# Patient Record
Sex: FEMALE | Race: BLACK OR AFRICAN AMERICAN | NOT HISPANIC OR LATINO | Employment: UNEMPLOYED | URBAN - METROPOLITAN AREA
[De-identification: names, ages, dates, MRNs, and addresses within clinical notes are randomized per-mention and may not be internally consistent; named-entity substitution may affect disease eponyms.]

---

## 2019-12-20 ENCOUNTER — ANCILLARY PROCEDURE (OUTPATIENT)
Dept: ULTRASOUND IMAGING | Facility: CLINIC | Age: 69
End: 2019-12-20
Attending: FAMILY MEDICINE
Payer: COMMERCIAL

## 2019-12-20 DIAGNOSIS — M79.89 LEFT LEG SWELLING: ICD-10-CM

## 2020-07-03 ENCOUNTER — HOSPITAL ENCOUNTER (EMERGENCY)
Facility: CLINIC | Age: 70
Discharge: HOME | End: 2020-07-03
Attending: FAMILY MEDICINE | Admitting: FAMILY MEDICINE
Payer: MEDICARE

## 2020-07-03 ENCOUNTER — APPOINTMENT (OUTPATIENT)
Dept: GENERAL RADIOLOGY | Facility: CLINIC | Age: 70
End: 2020-07-03
Attending: FAMILY MEDICINE
Payer: MEDICARE

## 2020-07-03 VITALS
TEMPERATURE: 97.2 F | OXYGEN SATURATION: 100 % | DIASTOLIC BLOOD PRESSURE: 62 MMHG | SYSTOLIC BLOOD PRESSURE: 144 MMHG | WEIGHT: 207 LBS | RESPIRATION RATE: 16 BRPM | BODY MASS INDEX: 28.87 KG/M2 | HEART RATE: 63 BPM

## 2020-07-03 DIAGNOSIS — S92.502D: ICD-10-CM

## 2020-07-03 DIAGNOSIS — S92.515A: ICD-10-CM

## 2020-07-03 PROCEDURE — 25000132 ZZH RX MED GY IP 250 OP 250 PS 637: Mod: GY | Performed by: FAMILY MEDICINE

## 2020-07-03 PROCEDURE — 73630 X-RAY EXAM OF FOOT: CPT | Mod: LT

## 2020-07-03 PROCEDURE — 99283 EMERGENCY DEPT VISIT LOW MDM: CPT | Mod: Z6 | Performed by: FAMILY MEDICINE

## 2020-07-03 PROCEDURE — 99283 EMERGENCY DEPT VISIT LOW MDM: CPT

## 2020-07-03 RX ORDER — ACETAMINOPHEN 500 MG
1000 TABLET ORAL ONCE
Status: COMPLETED | OUTPATIENT
Start: 2020-07-03 | End: 2020-07-03

## 2020-07-03 RX ORDER — LISINOPRIL 20 MG/1
20 TABLET ORAL
COMMUNITY
Start: 2019-12-09

## 2020-07-03 RX ADMIN — ACETAMINOPHEN 1000 MG: 500 TABLET ORAL at 12:44

## 2020-07-03 ASSESSMENT — ENCOUNTER SYMPTOMS
CONFUSION: 0
ACTIVITY CHANGE: 1
EYE REDNESS: 0
DECREASED CONCENTRATION: 0
WOUND: 0
COLOR CHANGE: 0
HEADACHES: 0
ABDOMINAL PAIN: 0
FEVER: 0
BRUISES/BLEEDS EASILY: 0
MYALGIAS: 0
DIFFICULTY URINATING: 0
DYSPHORIC MOOD: 0
SHORTNESS OF BREATH: 0
WEAKNESS: 0
JOINT SWELLING: 0
ARTHRALGIAS: 1
NECK STIFFNESS: 0

## 2020-07-03 NOTE — DISCHARGE INSTRUCTIONS
Home.  Xray shows healing fracture of the left 2nd toe.  Wear firm shoe given today for comfort.  Ice as needed and tylenol.  Follow up with MD in a week for recheck.    Results for orders placed or performed during the hospital encounter of 07/03/20   Foot XR, G/E 3 views, left     Status: None    Narrative    FOOT THREE VIEWS LEFT  7/3/2020 1:17 PM     HISTORY: left foot injury with pain in distal foot    COMPARISON: None.      Impression    IMPRESSION: Nondisplaced obliquely oriented fracture of the second  proximal phalangeal diaphysis. There is healing with callus formation.  No intra-articular extension. Normal joint alignment maintained. Mild  degenerative changes throughout the midfoot and forefoot.    THOMAS CHAU MD

## 2020-07-03 NOTE — ED NOTES
Pt given discharge paperwork and her son about setting up follow up appointment, ice/tylonel, when to seek medical treatment in the future, and follow up care. Vital signs are stable. Son and patient had no further questions or concerns. She left with ortho shoe on.

## 2020-07-03 NOTE — ED NOTES
Pt presents to the ED with complaints of left foot pain. Pt states she fell three weeks ago, and continuing to have pain. Pt has not been seen for this concern. Pt denies any symptoms of covid19. Pt states she takes two pills a day, one is for blood pressure, unsure of name of the medication and unsure of what the other medication is.

## 2020-07-03 NOTE — ED PROVIDER NOTES
[facility] EMERGENCY DEPARTMENT ([facility])    7/03/20        History     Chief Complaint   Patient presents with     Foot Pain     pt has been having left foot pain for three weeks.      JAYA Horan is a 70 year old female with a past medical history of chronic shoulder pain, HTN, and LLE DVT who presents to the Emergency Department for evaluation left leg pain.  Patient fell 3 weeks ago.  Patient notes tenderness in the distal forefoot the whole leg hurts somewhat but primarily the foot.  Has some pain with walking is not take anything for the pain now presents for evaluation.    I have reviewed the Medications, Allergies, Past Medical and Surgical History, and Social History in the [employer] system.  PAST MEDICAL HISTORY: History reviewed. No pertinent past medical history.    PAST SURGICAL HISTORY: History reviewed. No pertinent surgical history.    Past medical history, past surgical history, medications, and allergies were reviewed with the patient. Additional pertinent items: None    FAMILY HISTORY: History reviewed. No pertinent family history.    SOCIAL HISTORY:   Social History     Tobacco Use     Smoking status: Never Smoker     Smokeless tobacco: Never Used   Substance Use Topics     Alcohol use: Not Currently     Alcohol/week: 0.0 standard drinks     Social history was reviewed with the patient. Additional pertinent items: None            Discharge Medication List as of 7/3/2020  2:40 PM      CONTINUE these medications which have NOT CHANGED    Details   acetaminophen (TYLENOL) 650 MG suppository Place 650 mg rectally every 4 hours as needed for fever, Historical      ASPIRIN PO Take 81 mg by mouth, Historical      linaclotide (LINZESS) 145 MCG capsule Take 145 mcg by mouth, Historical      lisinopril (ZESTRIL) 20 MG tablet Take 20 mg by mouth, Historical      !! UNKNOWN TO PATIENT Historical      !! UNKNOWN TO PATIENT Historical       !! - Potential duplicate medications found. Please  discuss with provider.           No Known Allergies     Review of Systems   Constitutional: Positive for activity change. Negative for fever.   HENT: Negative for congestion.    Eyes: Negative for redness.   Respiratory: Negative for shortness of breath.    Cardiovascular: Negative for chest pain.   Gastrointestinal: Negative for abdominal pain.   Genitourinary: Negative for difficulty urinating.   Musculoskeletal: Positive for arthralgias and gait problem. Negative for joint swelling, myalgias and neck stiffness.        Left foot pain   Skin: Negative for color change, rash and wound.   Allergic/Immunologic: Negative for immunocompromised state.   Neurological: Negative for weakness and headaches.   Hematological: Does not bruise/bleed easily.   Psychiatric/Behavioral: Negative for confusion, decreased concentration and dysphoric mood.   All other systems reviewed and are negative.    Physical Exam   BP: 136/57  Pulse: 87  Temp: 97.5  F (36.4  C)  Resp: 16  Weight: 93.9 kg (207 lb)  SpO2: 100 %      Physical Exam  Vitals signs and nursing note reviewed.   Constitutional:       General: She is in acute distress.      Appearance: She is not ill-appearing or diaphoretic.      Comments: Mild discomfort  Son here interprets   HENT:      Head: Atraumatic.      Mouth/Throat:      Pharynx: No oropharyngeal exudate.   Eyes:      General: No scleral icterus.     Pupils: Pupils are equal, round, and reactive to light.   Neck:      Musculoskeletal: Normal range of motion.   Cardiovascular:      Rate and Rhythm: Normal rate.      Heart sounds: Normal heart sounds.   Pulmonary:      Effort: No respiratory distress.      Breath sounds: Normal breath sounds.   Abdominal:      General: Bowel sounds are normal.      Palpations: Abdomen is soft.      Tenderness: There is no abdominal tenderness.   Musculoskeletal:         General: Tenderness (left foot) present. No swelling.        Feet:    Skin:     General: Skin is warm.       Capillary Refill: Capillary refill takes less than 2 seconds.      Findings: No rash.   Neurological:      General: No focal deficit present.      Mental Status: Mental status is at baseline.   Psychiatric:         Mood and Affect: Mood normal.         Behavior: Behavior normal.         ED Course        Procedures         xrays ordered of foot.  X-rays reveal a healing fracture of the second toe the proximal phalanx without displacement.  No other injuries noted.  This reviewed by radiology also discussed with patient family  Patient placed in a postop shoe feels better would like to go home.  Conservative management follow-up with MD                Results for orders placed or performed during the hospital encounter of 07/03/20 (from the past 24 hour(s))   Foot XR, G/E 3 views, left    Narrative    FOOT THREE VIEWS LEFT  7/3/2020 1:17 PM     HISTORY: left foot injury with pain in distal foot    COMPARISON: None.      Impression    IMPRESSION: Nondisplaced obliquely oriented fracture of the second  proximal phalangeal diaphysis. There is healing with callus formation.  No intra-articular extension. Normal joint alignment maintained. Mild  degenerative changes throughout the midfoot and forefoot.    THOMAS CHAU MD     Medications   acetaminophen (TYLENOL) tablet 1,000 mg (1,000 mg Oral Given 7/3/20 1244)            Results for orders placed or performed during the hospital encounter of 07/03/20   Foot XR, G/E 3 views, left     Status: None    Narrative    FOOT THREE VIEWS LEFT  7/3/2020 1:17 PM     HISTORY: left foot injury with pain in distal foot    COMPARISON: None.      Impression    IMPRESSION: Nondisplaced obliquely oriented fracture of the second  proximal phalangeal diaphysis. There is healing with callus formation.  No intra-articular extension. Normal joint alignment maintained. Mild  degenerative changes throughout the midfoot and forefoot.    THOMAS CHAU MD         Assessments & Plan (with  Medical Decision Making)  70-year-old female who had a fall 3 weeks ago complains with left foot pain.  X-rays done revealing nondisplaced healing fracture of the second toe proximal phalanx no other injury seen.  Patient given a postop shoe feeling better will be discharged follow-up in a week discharge with family agree with plan.         I have reviewed the nursing notes.    I have reviewed the findings, diagnosis, plan and need for follow up with the patient.    Discharge Medication List as of 7/3/2020  2:40 PM          Final diagnoses:   Closed fracture of phalanx of left second toe with routine healing       7/3/2020   [facility], EMERGENCY DEPARTMENT      This note was created at least in part by the use of dragon voice dictation system. Inadvertent typographical errors may still exist.  Jean Carlos Euceda MD.    Patient evaluated in the emergency department during the COVID-19 pandemic period. Careful attention to patients safety was addressed throughout the evaluation. Evaluation and treatment management was initiated with disposition made efficiently and appropriate as possible to minimize any risk of potential exposure to patient during this evaluation.       Jean Carlos Euceda MD  07/03/20 0473

## 2020-07-03 NOTE — ED AVS SNAPSHOT
[facility], [place], Emergency Department  [address]  [facility] [address]  Phone:  [phone]  Fax:  [phone]                                    Estela Horan   MRN: [id number]    Department:  [facility], Emergency Department   Date of Visit:  7/3/2020           After Visit Summary Signature Page    I have received my discharge instructions, and my questions have been answered. I have discussed any challenges I see with this plan with the nurse or doctor.    ..........................................................................................................................................  Patient/Patient Representative Signature      ..........................................................................................................................................  Patient Representative Print Name and Relationship to Patient    ..................................................               ................................................  Date                                   Time    ..........................................................................................................................................  Reviewed by Signature/Title    ...................................................              ..............................................  Date                                               Time          22EPIC Rev 08/18

## 2020-12-28 ENCOUNTER — HOSPITAL ENCOUNTER (OUTPATIENT)
Dept: GENERAL RADIOLOGY | Facility: CLINIC | Age: 70
End: 2020-12-28
Attending: FAMILY MEDICINE
Payer: COMMERCIAL

## 2020-12-28 DIAGNOSIS — G89.29 CHRONIC BILATERAL LOW BACK PAIN WITH BILATERAL SCIATICA: ICD-10-CM

## 2020-12-28 DIAGNOSIS — M54.42 CHRONIC BILATERAL LOW BACK PAIN WITH BILATERAL SCIATICA: ICD-10-CM

## 2020-12-28 DIAGNOSIS — M25.561 BILATERAL CHRONIC KNEE PAIN: ICD-10-CM

## 2020-12-28 DIAGNOSIS — M54.41 CHRONIC BILATERAL LOW BACK PAIN WITH BILATERAL SCIATICA: ICD-10-CM

## 2020-12-28 DIAGNOSIS — M25.562 BILATERAL CHRONIC KNEE PAIN: ICD-10-CM

## 2020-12-28 DIAGNOSIS — G89.29 BILATERAL CHRONIC KNEE PAIN: ICD-10-CM

## 2020-12-28 PROCEDURE — 72100 X-RAY EXAM L-S SPINE 2/3 VWS: CPT

## 2020-12-28 PROCEDURE — 73560 X-RAY EXAM OF KNEE 1 OR 2: CPT | Mod: 26 | Performed by: RADIOLOGY

## 2020-12-28 PROCEDURE — 72100 X-RAY EXAM L-S SPINE 2/3 VWS: CPT | Mod: 26 | Performed by: RADIOLOGY

## 2020-12-28 PROCEDURE — 73560 X-RAY EXAM OF KNEE 1 OR 2: CPT | Mod: 50

## 2022-06-07 ENCOUNTER — TRANSCRIBE ORDERS (OUTPATIENT)
Dept: OTHER | Age: 72
End: 2022-06-07

## 2022-06-07 DIAGNOSIS — Z86.718 PERSONAL HISTORY OF DVT (DEEP VEIN THROMBOSIS): Primary | ICD-10-CM

## 2022-07-15 ENCOUNTER — HOSPITAL ENCOUNTER (EMERGENCY)
Facility: CLINIC | Age: 72
Discharge: HOME | End: 2022-07-15
Attending: INTERNAL MEDICINE | Admitting: INTERNAL MEDICINE
Payer: COMMERCIAL

## 2022-07-15 ENCOUNTER — HOSPITAL ENCOUNTER (OUTPATIENT)
Dept: ULTRASOUND IMAGING | Facility: CLINIC | Age: 72
Discharge: HOME | End: 2022-07-15
Attending: PHYSICIAN ASSISTANT | Admitting: PHYSICIAN ASSISTANT
Payer: COMMERCIAL

## 2022-07-15 VITALS
TEMPERATURE: 98.3 F | DIASTOLIC BLOOD PRESSURE: 66 MMHG | OXYGEN SATURATION: 98 % | HEART RATE: 62 BPM | RESPIRATION RATE: 16 BRPM | SYSTOLIC BLOOD PRESSURE: 144 MMHG

## 2022-07-15 DIAGNOSIS — Z86.718 HISTORY OF DVT (DEEP VEIN THROMBOSIS): ICD-10-CM

## 2022-07-15 DIAGNOSIS — M79.89 LEG SWELLING: ICD-10-CM

## 2022-07-15 DIAGNOSIS — I82.412 ACUTE DEEP VEIN THROMBOSIS (DVT) OF FEMORAL VEIN OF LEFT LOWER EXTREMITY (H): ICD-10-CM

## 2022-07-15 LAB — RADIOLOGIST FLAGS: ABNORMAL

## 2022-07-15 PROCEDURE — 93970 EXTREMITY STUDY: CPT

## 2022-07-15 PROCEDURE — 93970 EXTREMITY STUDY: CPT | Mod: 26 | Performed by: RADIOLOGY

## 2022-07-15 PROCEDURE — 99283 EMERGENCY DEPT VISIT LOW MDM: CPT | Performed by: INTERNAL MEDICINE

## 2022-07-15 PROCEDURE — 99283 EMERGENCY DEPT VISIT LOW MDM: CPT

## 2022-07-15 ASSESSMENT — ENCOUNTER SYMPTOMS: MYALGIAS: 0

## 2022-07-15 NOTE — ED PROVIDER NOTES
[facility] EMERGENCY DEPARTMENT ([facility])     July 15, 2022     History     Chief Complaint   Patient presents with     Leg Pain     Left leg pain; confirmed clot from ultrasound; left leg     HPI  Estela Horan is a 72 year old [place] female on aspirin with a past medical history including DVT, benign essential HTN, right hemopneumothorax, hypokalemia who presents to the Emergency Department for evaluation of left leg pain. Patient reports through [place] . She had an US performed today which showed a blood clot in her left leg, included below.  Endorses left leg swelling.  She denies chest pain or any other pain, and states she took an aspirin earlier. Denies shortness of breath. She endorses a history of blood clots, but said she last had them a long time ago and that she was told there are some left in her veins.  She does not remember the name of what her medication was when she originally had DVTs.  The patient requests that her medications be sent to [facility] pharmacy.    DOPPLER VENOUS ULTRASOUND OF BILATERAL LOWER EXTREMITIES,  7/15/2022 3:37 PM  IMPRESSION:  1. Nonocclusive thrombus within the distal left femoral vein.  2. There is partially occlusive thrombus in one of the paired left  posterior tibial veins. There is fully occlusive thrombus in the other  left posterior tibial vein.      Past Medical History  History reviewed. No pertinent past medical history.  No past surgical history on file.  acetaminophen (TYLENOL) 650 MG suppository  rivaroxaban ANTICOAGULANT (XARELTO) 15 MG TABS tablet  linaclotide (LINZESS) 145 MCG capsule  lisinopril (ZESTRIL) 20 MG tablet      No Known Allergies  Past medical history, past surgical history, medications, and allergies were reviewed with the patient. Additional pertinent items: Retrieved from Care Everywhere.    Family History  No family history on file.  Family history was reviewed with the patient. Additional pertinent items: None    Social  History  Social History     Tobacco Use     Smoking status: Never Smoker     Smokeless tobacco: Never Used   Substance Use Topics     Alcohol use: Not Currently     Alcohol/week: 0.0 standard drinks     Drug use: Not Currently      Social history was reviewed with the patient. Additional pertinent items: None      Review of Systems   Constitutional: Negative for fever.   HENT: Negative for congestion.    Eyes: Negative for redness.   Respiratory: Negative for shortness of breath.    Cardiovascular: Positive for leg swelling (left). Negative for chest pain.   Gastrointestinal: Negative for abdominal pain.   Genitourinary: Negative for difficulty urinating.   Musculoskeletal: Negative for arthralgias, myalgias and neck stiffness.   Skin: Negative for color change.   Neurological: Negative for headaches.   Psychiatric/Behavioral: Negative for confusion.   All other systems reviewed and are negative.    A complete review of systems was performed with pertinent positives and negatives noted in the HPI, and all other systems negative.    Physical Exam   BP: (!) 144/66  Pulse: 62  Temp: 98.3  F (36.8  C)  Resp: 16  SpO2: 98 %  Physical Exam  Constitutional:       General: She is not in acute distress.     Appearance: She is not diaphoretic.   HENT:      Head: Atraumatic.      Mouth/Throat:      Pharynx: No oropharyngeal exudate.   Eyes:      General: No scleral icterus.     Pupils: Pupils are equal, round, and reactive to light.   Cardiovascular:      Rate and Rhythm: Normal rate and regular rhythm.      Heart sounds: Normal heart sounds. No murmur heard.    No friction rub. No gallop.   Pulmonary:      Effort: Pulmonary effort is normal. No respiratory distress.      Breath sounds: Normal breath sounds. No stridor. No wheezing, rhonchi or rales.   Chest:      Chest wall: No tenderness.   Abdominal:      General: Abdomen is flat. Bowel sounds are normal. There is no distension.      Palpations: Abdomen is soft. There is no  mass.      Tenderness: There is no abdominal tenderness. There is no right CVA tenderness, left CVA tenderness, guarding or rebound.      Hernia: No hernia is present.   Musculoskeletal:         General: Swelling and tenderness present. No deformity or signs of injury.      Cervical back: Neck supple.      Right lower leg: No edema.      Left lower leg: Edema present.   Skin:     General: Skin is warm.      Findings: No rash.   Neurological:      General: No focal deficit present.      Cranial Nerves: No cranial nerve deficit.      Motor: No weakness.         ED Course     5:14 PM  The patient was seen and examined by Dario Lopez MD in Room ED20.    Procedures                     Results for orders placed or performed during the hospital encounter of 07/15/22   US Lower Extremity Venous Duplex Bilateral     Status: Abnormal   Result Value Ref Range    Radiologist flags Left lower extremity deep venous thrombosis (Urgent)     Narrative    EXAMINATION: DOPPLER VENOUS ULTRASOUND OF BILATERAL LOWER EXTREMITIES,  7/15/2022 3:37 PM     COMPARISON: Ultrasound lower extremity 12/20/2019 and 6/12/2015    HISTORY: Edema    TECHNIQUE:  Gray-scale evaluation with compression, spectral flow and  color Doppler assessment of the deep venous system of both legs from  groin to knee, and then at the ankles.    FINDINGS:  Right: the common femoral, femoral, popliteal and posterior tibial  veins demonstrate normal compressibility and blood flow. Great  saphenous vein demonstrates normal compressibility and blood flow.    Left: the common femoral and popliteal veins demonstrate normal  compressibility and blood flow. The proximal and mid femoral vein are  fully compressible with normal flow and waveforms. The distal femoral  vein demonstrates echogenic thrombus within the lumen, and is  partially compressible with diminished flow and waveforms. The  posterior tibial vein demonstrates echogenic thrombus within the lumen  proximally,  is partially compressible, and shows limited color Doppler  flow. The distal posterior tibial vein is fully compressible . The  second posterior tibial vein proximally demonstrates echogenic clot  within the lumen, is noncompressible and has absent flow on color  Doppler. The distal left posterior tibial vein  is partially  compressible. The peroneal vein is not visualized.      Impression    IMPRESSION:  1. Nonocclusive thrombus within the distal left femoral vein.  2. There is partially occlusive thrombus in one of the paired left  posterior tibial veins. There is fully occlusive thrombus in the other  left posterior tibial vein.    [Urgent Result: Left lower extremity deep venous thrombosis    Finding was identified on 7/15/2022 3:38 PM.     Dr. Garcia attempted to contact ordering provider Polina Mcdaniels at  7/15/2022 3:57 PM  with the provided number. Provider was not able to  be contacted with the urgent result, so the patient was sent to the  emergency Department.    I have personally reviewed the examination and initial interpretation  and I agree with the findings.    KENNEDY ESCALONA MD         SYSTEM ID:  [id number]     Medications - No data to display     Assessments & Plan (with Medical Decision Making)  Acute recurrent left leg DVT, labs done at the [facility]-calculated GFR 52, will start xarelto 15 bid for 21 days, maintenance part per her PMD, should follow up in 1-2 weeks.       I have reviewed the nursing notes. I have reviewed the findings, diagnosis, plan and need for follow up with the patient.    Discharge Medication List as of 7/15/2022  6:04 PM      START taking these medications    Details   rivaroxaban ANTICOAGULANT (XARELTO) 15 MG TABS tablet Take 1 tablet (15 mg) by mouth 2 times daily (with meals) for 21 days, Disp-42 tablet, R-0, E-Prescribe             Final diagnoses:   Acute deep vein thrombosis (DVT) of femoral vein of left lower extremity (H)     ITara, am serving as a  trained medical scribe to document services personally performed by Dario Lopez MD, based on the provider's statements to me.   I, Dario Lopez MD, was physically present and have reviewed and verified the accuracy of this note documented by Tara Shahid.    --  Dario Lopez Md  [facility] EMERGENCY DEPARTMENT  7/15/2022     Dario Lopez MD  07/16/22 0101

## 2022-07-15 NOTE — DISCHARGE INSTRUCTIONS
Please start taking blood thinner that has been prescribed and make an appointment to follow up with Your Primary Care Provider in 5-10 days even if entirely better.

## 2022-07-16 ASSESSMENT — ENCOUNTER SYMPTOMS
COLOR CHANGE: 0
ARTHRALGIAS: 0
CONFUSION: 0
HEADACHES: 0
NECK STIFFNESS: 0
ABDOMINAL PAIN: 0
FEVER: 0
DIFFICULTY URINATING: 0
SHORTNESS OF BREATH: 0
EYE REDNESS: 0

## 2022-10-04 ENCOUNTER — TRANSCRIBE ORDERS (OUTPATIENT)
Dept: OTHER | Age: 72
End: 2022-10-04

## 2022-10-04 DIAGNOSIS — D50.9 ANEMIA, IRON DEFICIENCY: ICD-10-CM

## 2022-10-04 DIAGNOSIS — Z86.718 PERSONAL HISTORY OF DVT (DEEP VEIN THROMBOSIS): Primary | ICD-10-CM

## 2022-10-18 NOTE — TELEPHONE ENCOUNTER
RECORDS STATUS - ALL OTHER DIAGNOSIS      RECORDS RECEIVED FROM: [facility]   DATE RECEIVED: 10/18/22   NOTES STATUS DETAILS   OFFICE NOTE from referring provider MARLENI 09/29/22:Dr. Lucretia Snow   DISCHARGE REPORT from the ER Epic, [facility] 07/15/22: [facility] ED  10/21/10: [facility]   MEDICATION LIST EPIC    LABS     ANYTHING RELATED TO DIAGNOSIS MARLENI Most recent 08/12/22   IMAGING (NEED IMAGES & REPORT)     ULTRASOUND PACS 07/15/22, 12/20/19, 06/12/15: US Lower Extremity

## 2022-10-19 DIAGNOSIS — D50.0 IRON DEFICIENCY ANEMIA DUE TO CHRONIC BLOOD LOSS: Primary | ICD-10-CM

## 2022-10-20 ENCOUNTER — PRE VISIT (OUTPATIENT)
Dept: TRANSPLANT | Facility: CLINIC | Age: 72
End: 2022-10-20

## 2023-10-20 ENCOUNTER — MEDICAL CORRESPONDENCE (OUTPATIENT)
Dept: HEALTH INFORMATION MANAGEMENT | Facility: CLINIC | Age: 73
End: 2023-10-20
Payer: COMMERCIAL

## 2023-10-23 ENCOUNTER — TRANSCRIBE ORDERS (OUTPATIENT)
Dept: OTHER | Age: 73
End: 2023-10-23

## 2023-10-23 DIAGNOSIS — Z86.718 HISTORY OF DVT (DEEP VEIN THROMBOSIS): Primary | ICD-10-CM

## 2023-10-23 DIAGNOSIS — R60.0 EDEMA OF BOTH LEGS: ICD-10-CM

## 2023-10-24 ENCOUNTER — TRANSCRIBE ORDERS (OUTPATIENT)
Dept: OTHER | Age: 73
End: 2023-10-24

## 2023-10-24 DIAGNOSIS — G89.29 CHRONIC LOW BACK PAIN, UNSPECIFIED BACK PAIN LATERALITY, UNSPECIFIED WHETHER SCIATICA PRESENT: Primary | ICD-10-CM

## 2023-10-24 DIAGNOSIS — M79.89 LEG SWELLING: Primary | ICD-10-CM

## 2023-10-24 DIAGNOSIS — M54.50 CHRONIC LOW BACK PAIN, UNSPECIFIED BACK PAIN LATERALITY, UNSPECIFIED WHETHER SCIATICA PRESENT: Primary | ICD-10-CM

## 2023-12-06 ENCOUNTER — TELEPHONE (OUTPATIENT)
Dept: VASCULAR SURGERY | Facility: CLINIC | Age: 73
End: 2023-12-06
Payer: COMMERCIAL

## 2023-12-07 ENCOUNTER — TELEPHONE (OUTPATIENT)
Dept: VASCULAR SURGERY | Facility: CLINIC | Age: 73
End: 2023-12-07
Payer: COMMERCIAL

## 2023-12-12 NOTE — PROGRESS NOTES
INTERVENTIONAL RADIOLOGY CLINIC NOTE    Estela Horan  [id number]  12/13/23    CC:  History of DVT (deep vein thrombosis) and Edema of both legs     HPI:  Estela High a 73 year old female with a PMH of chronic lower back pain, hx of DVT, edema, CKD, iron deficiency, HTN, pre-diabetes who presents for evaluation.     Jimmy yooneint had a US LE extremity on 7/15/22 and was found to have a non-occlusive thrombus in distal left femoral vein, there is a partially occlusive thrombus in one of the pair left posterial tibial veins, fully occlusive thrombus in other left posterial tibial vein.     Patient reports that she's been having pain and discomfort in her legs for 17 years. She had a blood clot in left side, and since that time she's had more swelling in the left leg. She notes pain throughout both of her legs, movement tends to make it worse. She reports leg heaviness, swelling, aching, and muscle cramps. Denies use of compression stockings. Denies skin dryness, tightness, bleeding, itching, skin irritation, and muscle cramps. Denies having varicosities. Denies family history. Denies trauma to the extremity. She had 17 pregnancies, 5 living children.     PAST MEDICAL HISTORY:  chronic lower back pain, hx of DVT, edema, CKD, iron deficiency, HTN, pre-diabetes    PAST SURGICAL HISTORY:  No past surgical history on file.    MEDICATIONS:  Current Outpatient Medications   Medication    acetaminophen (TYLENOL) 650 MG suppository    linaclotide (LINZESS) 145 MCG capsule    lisinopril (ZESTRIL) 20 MG tablet    rivaroxaban ANTICOAGULANT (XARELTO) 15 MG TABS tablet     No current facility-administered medications for this visit.     ALLERGIES:   No Known Allergies    PHYSICAL EXAMINATION:  /61 (BP Location: Left arm, Patient Position: Sitting)   Pulse 74   SpO2 100%   General: AAOx3. Pleasant in NAD.   HEENT: NC/AT  LUNGS: Normal respirations.  LEGS: Pulses +1 bilateral. +1 pitting edema, no telangiectasias or  varicosities.     LABS/IMAGING:  No recent lab results.                  Imaging Reviewed:  US lower extremity venous duplex bilateral 7/15/22:  IMPRESSION:  1. Nonocclusive thrombus within the distal left femoral vein.  2. There is partially occlusive thrombus in one of the paired left  posterior tibial veins. There is fully occlusive thrombus in the other  left posterior tibial vein.    US venous competency bilateral 23:  Impression:  1. Right lea. No deep or superficial venous thrombosis.  1b. Incompetent CFV above the SFJ with Valsalva alone.  1c. Incompetent proximal calf GSV, and SSV at the popliteal fossa and  proximal calf.  1d. No incompetent varicose or  veins.  2. Left leg  2a. No deep or superficial venous thrombosis.  2b. Incompetent distal femoral vein and popliteal vein.  2c. Incompetent mid calf SSV.  2d. No encompassing varicose or  veins.    US RYAN doppler 23:  IMPRESSION:  1. RIGHT:       A. Resting RYAN is 1.04, normal. Normal PVRs.     2. LEFT:       A. Resting RYAN is 1.21, normal. Normal PVRs.    IMPRESSION/REPORT/PLAN:  Summary: Estela High a 73 year old female with a PMH of chronic lower back pain, hx of DVT, edema, CKD, iron deficiency, HTN, pre-diabetes who presents for evaluation.     #Venous incompetence: ABIs normal. Right incompetent CFV, SFJ with valsalva, incompetent proximal calf GSV, and SSV at the popliteal fossa and prox calf. Left incompetent distal femoral vein and popliteal vein, incompetent mid calf SSV. Patient's BMI is elevated, she has also had 17 pregnancies (several miscarriages) with 5 living children. Pain in bilateral lower extremities is constant. Worsens with exertion. She has not been consistent with compression stockings. Informed patient that we do no provide pain medications for her leg discomfort.   #Hx of DVT on L: US LE extremity on 7/15/22 and was found to have a non-occlusive thrombus in distal left femoral vein, there  is a partially occlusive thrombus in one of the pair left posterial tibial veins, fully occlusive thrombus in other left posterial tibial vein.     PLAN:  -The patient's medical data, including pertinent imaging, was reviewed.    -Discussed need for weight loss, I suspect she would have improvement of her symptoms with this.    -Plan for compression stockings x3 months, thigh high 20-30 mmHg  -Given the nature of patient's pain throughout her legs, I informed her that it is not clear that her pain is totally related to her venous incompetence. I suspect given her discomfort, she may have other factors contributing.   -Pt to elevate her legs x1 hour per day  -Encouraged patient to walk 20 minutes per day  -Vascular follow up in 3 months, patient would like to speak with one of our IR physicians for further information in 3 months.  -All of the patient's questions were answered to their satisfaction.    Patient was communicated with via [employer] .     Kezia Chiu PA-C   Physician Assistant - Certified  Interventional Radiology  [phone] (IR control desk)      =====    A total of 35 minutes was spent with the patient.  Greater than 50% of the time was spent in counseling, education, and coordination of care.

## 2023-12-13 ENCOUNTER — OFFICE VISIT (OUTPATIENT)
Dept: VASCULAR SURGERY | Facility: CLINIC | Age: 73
End: 2023-12-13
Payer: COMMERCIAL

## 2023-12-13 ENCOUNTER — ANCILLARY PROCEDURE (OUTPATIENT)
Dept: ULTRASOUND IMAGING | Facility: CLINIC | Age: 73
End: 2023-12-13
Attending: RADIOLOGY
Payer: COMMERCIAL

## 2023-12-13 VITALS — HEART RATE: 74 BPM | SYSTOLIC BLOOD PRESSURE: 123 MMHG | DIASTOLIC BLOOD PRESSURE: 61 MMHG | OXYGEN SATURATION: 100 %

## 2023-12-13 DIAGNOSIS — M79.89 LEG SWELLING: ICD-10-CM

## 2023-12-13 DIAGNOSIS — I87.2 VENOUS INCOMPETENCE: Primary | ICD-10-CM

## 2023-12-13 PROCEDURE — 93922 UPR/L XTREMITY ART 2 LEVELS: CPT | Mod: GC | Performed by: RADIOLOGY

## 2023-12-13 PROCEDURE — 93970 EXTREMITY STUDY: CPT | Mod: GC | Performed by: RADIOLOGY

## 2023-12-13 PROCEDURE — 99203 OFFICE O/P NEW LOW 30 MIN: CPT | Performed by: PHYSICIAN ASSISTANT

## 2023-12-13 RX ORDER — ASPIRIN 81 MG/1
81 TABLET, COATED ORAL DAILY
COMMUNITY

## 2023-12-13 RX ORDER — AMLODIPINE BESYLATE 10 MG/1
1 TABLET ORAL DAILY
COMMUNITY
Start: 2023-11-28

## 2023-12-13 RX ORDER — IBUPROFEN 200 MG
1250 CAPSULE ORAL DAILY
COMMUNITY

## 2023-12-13 NOTE — PATIENT INSTRUCTIONS
INTERVENTIONAL RADIOLOGY CLINIC AFTER VISIT SUMMARY:  Dear Estela Horan,    Thank you for choosing the [facility]. I would like to highlight some of the important information from our visit.     [facility]: [address]  You have been given medical grade compression stockings, 20-30 mmHg thigh high  Please ensure proper sizing and fit  Wear during the day to help with symptoms, do not need to wear compression during the night  Encourage walking, a 20-minute walk/day  Elevate legs for an hour each day  Return to vascular clinic in 3 months for follow-up to discuss treatment options  If questions or concerns please call [phone]    Please do not hesitate to contact our department if you have any further questions or concerns.     Sincerely,    Kezia Chiu PA-C    [facility]  [address] [place] [address], [id number] [phone]

## 2023-12-21 ENCOUNTER — OFFICE VISIT (OUTPATIENT)
Dept: VASCULAR SURGERY | Facility: CLINIC | Age: 73
End: 2023-12-21
Attending: PHYSICIAN ASSISTANT
Payer: COMMERCIAL

## 2023-12-21 DIAGNOSIS — M79.89 LEG SWELLING: ICD-10-CM

## 2023-12-21 DIAGNOSIS — I87.2 VENOUS INCOMPETENCE: Primary | ICD-10-CM

## 2023-12-21 PROCEDURE — 99203 OFFICE O/P NEW LOW 30 MIN: CPT | Performed by: SURGERY

## 2023-12-21 NOTE — PROGRESS NOTES
I had the pleasure of seeing Mrs. Estela Horan in the vein clinic today.  This is a kind referral from Kezia Chiu PA-C.    Patient reports bilateral, left worse than right, swelling, heaviness, tightness and pain.    This has been going on for quite a number of years.    She has a remote history of deep venous thrombosis in the left lower extremity for which she was on anticoagulation for 6 months.    She also has back pain and bilateral knee pain.    On examination she has significant edema of both lower extremities, left worse than right.    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  There is incompetence of the left distal femoral and popliteal vein.  Superficial veins are competent.    Diagnosis: Bilateral lower extremity, left worse than right phlebolymphedema due to chronic ambulatory venous hypertension.    Plan: No role for surgical intervention or ablation.  I explained to the patient and to her family member her vein incompetence is very segmental and mild and surgical intervention with ablation will certainly not improve her symptoms.    Advise to referring provider, Kezia Chiu:  1.  Refer the patient for lymphedema therapy.  Lymphedema therapy referral needs to be placed by the referring provider.  Patient herself does not speak English.  Go to the patient's granddaughter Jazmine Damon, phone number [phone] to coordinate her lymphedema therapy referral.  2.  She is also complaining of significant knee joint pain.  Refer the patient for orthopedic evaluation.    She can follow-up with vein clinic on a as needed basis.

## 2023-12-21 NOTE — NURSING NOTE
Patient Reported symptoms:    Bilateral leg   Heaviness Most of the time   Achiness Most of the time   Swelling Most of the time   Throbbing A good bit of the time   Itching None of the time   Appearance Moderately noticeable   Impact on work/activities Symptoms but full able to participate

## 2023-12-21 NOTE — LETTER
12/21/2023         RE: Estela Horan  [address]  [facility] [address]        Dear Colleague,    Thank you for referring your patient, Estela Horan, to the [facility]. Please see a copy of my visit note below.    I had the pleasure of seeing Mrs. Estela Horan in the vein clinic today.  This is a kind referral from Kezia Chiu PA-C.    Patient reports bilateral, left worse than right, swelling, heaviness, tightness and pain.    This has been going on for quite a number of years.    She has a remote history of deep venous thrombosis in the left lower extremity for which she was on anticoagulation for 6 months.    She also has back pain and bilateral knee pain.    On examination she has significant edema of both lower extremities, left worse than right.    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  There is incompetence of the left distal femoral and popliteal vein.  Superficial veins are competent.    Diagnosis: Bilateral lower extremity, left worse than right phlebolymphedema due to chronic ambulatory venous hypertension.    Plan: No role for surgical intervention or ablation.  I explained to the patient and to her family member her vein incompetence is very segmental and mild and surgical intervention with ablation will certainly not improve her symptoms.    Advise to referring provider, Kezia Chiu:  1.  Refer the patient for lymphedema therapy.  2.  She is also complaining of significant knee joint pain.  Refer the patient for orthopedic evaluation.    She can follow-up with vein clinic on a as needed basis.      Again, thank you for allowing me to participate in the care of your patient.        Sincerely,        Kirill Jones MD

## 2023-12-27 ENCOUNTER — THERAPY VISIT (OUTPATIENT)
Dept: PHYSICAL THERAPY | Facility: CLINIC | Age: 73
End: 2023-12-27
Attending: SURGERY
Payer: COMMERCIAL

## 2023-12-27 DIAGNOSIS — M79.89 LEG SWELLING: ICD-10-CM

## 2023-12-27 PROCEDURE — 97535 SELF CARE MNGMENT TRAINING: CPT | Mod: GP | Performed by: PHYSICAL THERAPIST

## 2023-12-27 PROCEDURE — 97161 PT EVAL LOW COMPLEX 20 MIN: CPT | Mod: GP | Performed by: PHYSICAL THERAPIST

## 2023-12-27 NOTE — PROGRESS NOTES
PHYSICAL THERAPY EVALUATION  Type of Visit: Evaluation    See electronic medical record for Abuse and Falls Screening details.    Subjective   Is having pain in her legs, low back and bottom of the foot. She has had previous DVT, recent testing shows no current issues. Pt reports swelling for a few years, but it accelerated and more in left lower leg since DVT.      Presenting condition or subjective complaint: Leg swelling  Date of onset: 12/21/23   Relevant medical history:   Hx of DVT, CKD, iron deficiency, HTN, chronic lower back pain, pre-diabetes  Dates & types of surgery:      Prior diagnostic imaging/testing results: Other Ultrasound, DVT Rule out and venous insufficiency studies   Prior therapy history for the same diagnosis, illness or injury: No      Prior Level of Function  Transfers: Independent  Ambulation: Independent  ADL: Independent  IADL: Laundry, Meal preparation, Medication management    Living Environment  Social support:   Family assist  Type of home:     Stairs to enter the home:         Ramp:     Stairs inside the home:         Help at home:    Equipment owned:       Employment:      Hobbies/Interests:      Patient goals for therapy: walk, reduce pain    Pain assessment: Pain present  Pain primarily in feet, increasing over the last few months, rated 8/10.  Sharp pain to the feet, dull pain to legs     Objective       EDEMA EVALUATION  Additional history:  Body part affected by edema: BLE Left worse than R  If cancer related, treatment:    If not cancer related, problems with veins or cause of swelling: Yes, Venous insufficiency  Distance able to walk: limiting, no longer than 5 minutes  Time able to stand: 5-10 minutes  Sensation problems in hands/feet: Yes    Edema etiology: Chronic Venous Insufficiency, DVT    FUNCTIONAL SCALES  LLIS 46    Cognitive Status Examination  Orientation: Oriented to person, place and time   Level of Consciousness: Alert  Follows Commands and Answers Questions:  100% of the time  Personal Safety and Judgement:  Unable to assess  Memory:  Not assessed    EDEMA  Skin Condition: Intact, Non-pitting, Venous distention  Soft edema of BLE, foot sparing, puffy   Scar: No  Capillary Refill: Symmetrical  Dorsal Pedal Pulse: not palpable  Stemmer Sign: -  Ulceration: No    GIRTH MEASUREMENTS: Refer to separate girth measurement flowsheet.     VOLUME LE  Right LE (mL) 6577.47 mL   Left LE (mL) 8153.54     LE Volume Comparison LLE volume greater than RLE volume   % Difference    Head/Neck Volume     Trunk Volume    Genital Volume       RANGE OF MOTION: LE ROM WFL  STRENGTH: UE Strength WFL  POSTURE: WFL  PALPATION: Tender to palpation to BLE  ACTIVITIES OF DAILY LIVING: IND  BED MOBILITY: Independent  TRANSFERS: Independent  GAIT/LOCOMOTION: Decreased due to leg and back pain, but IND without device  BALANCE:  Not formally tested  SENSATION:  Impairment noted  VASCULAR: Vascular concerns    Assessment & Plan   CLINICAL IMPRESSIONS  Medical Diagnosis: Leg swelling    Treatment Diagnosis: Lymphedema   Impression/Assessment: Patient is a 73 year old female with BLE pain and edema complaints.  The following significant findings have been identified: Pain and Edema. These impairments interfere with their ability to perform self care tasks, recreational activities, household chores, and community mobility as compared to previous level of function.     Clinical Decision Making (Complexity):  Clinical Presentation: Stable/Uncomplicated  Clinical Presentation Rationale: based on medical and personal factors listed in PT evaluation  Clinical Decision Making (Complexity): Low complexity    PLAN OF CARE  Treatment Interventions:  Interventions: Manual Therapy, Therapeutic Exercise, Self-Care/Home Management, Gradient Compression Bandaging    Long Term Goals     PT Goal 1  Goal Identifier: Lymphedema Home Program  Goal Description: Pt will be independent with drainage exercise, self massage, and  skin care to best manage swelling to decrease symptoms.  Target Date: 03/25/24  PT Goal 2  Goal Identifier: Volume  Goal Description: Pt will have 5% reduction in BLE volume for improved tissue integrity, decreased risk of infection, and improved fit of clothing  Target Date: 03/25/24  PT Goal 3  Goal Identifier: LLIS  Goal Description: Pt will have at least 7 point reduction in score on subsequent assessment to reflect the MCID in impact of swelling on quality of life.  Goal Progress: Starting Score: 46  Target Date: 03/25/24  PT Goal 4  Goal Identifier: Maintenance  Goal Description: Pt will use compression garments as instructed AND home management tools/program for long term management of chronic condition to prevent tissue fibrosis, infection, wound and other secondary sequelae  Target Date: 03/25/24      Frequency of Treatment: up to 6 visits  Duration of Treatment: 90 days    Recommended Referrals to Other Professionals:   Education Assessment:   Learner/Method: Patient;Family;Listening;Demonstration    Risks and benefits of evaluation/treatment have been explained.   Patient/Family/caregiver agrees with Plan of Care.     Evaluation Time:     PT Eval, Low Complexity Minutes (88335): 16   Present: Yes: Language: Dutch; [facility] , ID Number/Identifier: Declined and signed waver for intrepreter     Signing Clinician: Clarissa Ren, PT      [facility]                                                                                   OUTPATIENT PHYSICAL THERAPY      PLAN OF TREATMENT FOR OUTPATIENT REHABILITATION   Patient's Last Name, First Name, Estela Walker YOB: 1950   Provider's Name   [facility]   Medical Record No.  [id number]     Onset Date: 12/21/23  Start of Care Date: 12/27/23     Medical Diagnosis:  Leg swelling      PT Treatment Diagnosis:  Lymphedema Plan of Treatment  Frequency/Duration: up to 6  visits/ 90 days    Certification date from 12/27/23 to 03/25/24         See note for plan of treatment details and functional goals     Clarissa Ren, PT                         I CERTIFY THE NEED FOR THESE SERVICES FURNISHED UNDER        THIS PLAN OF TREATMENT AND WHILE UNDER MY CARE     (Physician attestation of this document indicates review and certification of the therapy plan).              Referring Provider:  Kirill Jones    Initial Assessment  See Epic Evaluation- Start of Care Date: 12/27/23

## 2024-01-11 ENCOUNTER — THERAPY VISIT (OUTPATIENT)
Dept: PHYSICAL THERAPY | Facility: CLINIC | Age: 74
End: 2024-01-11
Attending: SURGERY
Payer: COMMERCIAL

## 2024-01-11 DIAGNOSIS — M79.89 LEG SWELLING: Primary | ICD-10-CM

## 2024-01-11 PROCEDURE — 97140 MANUAL THERAPY 1/> REGIONS: CPT | Mod: GP | Performed by: PHYSICAL THERAPIST

## 2024-01-11 NOTE — PROGRESS NOTES
01/11/24 0500   Appointment Info   Signing clinician's name / credentials Clarissa Ren PT,CLT-PINA   Total/Authorized Visits 2   Visits Used 1   Medical Diagnosis Leg swelling   PT Tx Diagnosis Lymphedema   Quick Adds Certification   Progress Note/Certification   Start of Care Date 12/27/23   Onset of illness/injury or Date of Surgery 12/21/23   Therapy Frequency up to 6 visits   Predicted Duration 90 days   Certification date from 12/27/23   Certification date to 03/25/24       Present Yes    Language Mongolian    ID or First/Last Name [id number]   GOALS   PT Goals 2;3;4   PT Goal 1   Goal Identifier Lymphedema Home Program   Goal Description Pt will be independent with drainage exercise, self massage, and skin care to best manage swelling to decrease symptoms.   Goal Progress not met, pt not interested   Target Date 03/25/24   PT Goal 2   Goal Identifier Volume   Goal Description Pt will have 5% reduction in BLE volume for improved tissue integrity, decreased risk of infection, and improved fit of clothing   Goal Progress not remeasured   Target Date 03/25/24   PT Goal 3   Goal Identifier LLIS   Goal Description Pt will have at least 7 point reduction in score on subsequent assessment to reflect the MCID in impact of swelling on quality of life.   Goal Progress did not readminister   Target Date 03/25/24   PT Goal 4   Goal Identifier Maintenance   Goal Description Pt will use compression garments as instructed AND home management tools/program for long term management of chronic condition to prevent tissue fibrosis, infection, wound and other secondary sequelae   Goal Progress Pt reports that she is using during day but did not wear today   Target Date 03/25/24   Subjective Report   Subjective Report Pt arrived without compression socks. She reports that she came for massage so did not wear today. She would like shoulders, back and legs massaged.   Objective Measure 1    Objective Measure Volume RLE   Details no new   Objective Measure 2   Objective Measure Volume LLE   Details no new   Manual Therapy   Manual Therapy: Mobilization, MFR, MLD, friction massage minutes (36128) 45   Manual Therapy 1 MLD   Manual Therapy 1 - Details Pt arrived wanting massage. She is educated on role of therapist and type of therapy as well as orders from MD not stating just for massage. Pt is agreeable to MLD instruction. She is educated on self MLD thought therapist does perform on pt first to understand strokes and pressure and then on self to demo how to do herself at home daily.  Pt first instrucuted on deep breathing, then lymph node stim at AX, SC and ING nodes followed but trunk and thigh clearing, then POP  node stim and lower leg clearing. Pt does follow and perform as requested by CLT.  She is encouraged at home to do with less layers on.  Pt asked for a foot massage. Swelling in feet is not apprent.  CLT does perform clearing of ankles and massage on dorsum of foot though really no swelling on foot and negative stemmer.  Pt wants more foot massage including bottom of foot which is not needed. She was given handout with pictures in sequence but declines to take and also does not want to return if she is not getting full body massage. Pt is encouraged to elevate her legs when possible, try to get 20minutes of walking in per day and use daytime compression.   Education   Learner/Method Patient   Education Comments role of therapist, MLD   Plan   Home program elevate, compression ,self massage, walk   Updates to plan of care discharge, pt does not want to return   Total Session Time   Timed Code Treatment Minutes 45   Total Treatment Time (sum of timed and untimed services) 45         DISCHARGE  Reason for Discharge: Patient chooses to discontinue therapy.    Equipment Issued: Pt got thigh highs    Discharge Plan: Patient to continue home program. Follow up  with primary about back and  shoulder pain    Referring Provider:  Kirill Jones